# Patient Record
Sex: MALE | Race: WHITE | Employment: FULL TIME | URBAN - METROPOLITAN AREA
[De-identification: names, ages, dates, MRNs, and addresses within clinical notes are randomized per-mention and may not be internally consistent; named-entity substitution may affect disease eponyms.]

---

## 2021-03-09 ENCOUNTER — HOSPITAL ENCOUNTER (EMERGENCY)
Age: 46
Discharge: HOME | End: 2021-03-09
Payer: COMMERCIAL

## 2021-03-09 VITALS
SYSTOLIC BLOOD PRESSURE: 142 MMHG | WEIGHT: 200 LBS | BODY MASS INDEX: 27.09 KG/M2 | TEMPERATURE: 98 F | DIASTOLIC BLOOD PRESSURE: 105 MMHG | HEIGHT: 72 IN | HEART RATE: 90 BPM | RESPIRATION RATE: 18 BRPM | OXYGEN SATURATION: 98 %

## 2021-03-09 DIAGNOSIS — K08.89 PAIN, DENTAL: Primary | ICD-10-CM

## 2021-03-09 DIAGNOSIS — R03.0 ELEVATED BLOOD PRESSURE READING: ICD-10-CM

## 2021-03-09 PROCEDURE — 99284 EMERGENCY DEPT VISIT MOD MDM: CPT

## 2021-03-09 PROCEDURE — 6370000000 HC RX 637 (ALT 250 FOR IP): Performed by: PHYSICIAN ASSISTANT

## 2021-03-09 RX ORDER — PENICILLIN V POTASSIUM 500 MG/1
500 TABLET ORAL ONCE
Status: COMPLETED | OUTPATIENT
Start: 2021-03-09 | End: 2021-03-09

## 2021-03-09 RX ORDER — LIDOCAINE HYDROCHLORIDE 20 MG/ML
15 SOLUTION OROPHARYNGEAL ONCE
Status: COMPLETED | OUTPATIENT
Start: 2021-03-09 | End: 2021-03-09

## 2021-03-09 RX ORDER — PENICILLIN V POTASSIUM 500 MG/1
500 TABLET ORAL 4 TIMES DAILY
Qty: 40 TABLET | Refills: 0 | Status: SHIPPED | OUTPATIENT
Start: 2021-03-09 | End: 2021-03-19

## 2021-03-09 RX ADMIN — LIDOCAINE HYDROCHLORIDE 15 ML: 20 SOLUTION ORAL; TOPICAL at 01:25

## 2021-03-09 RX ADMIN — PENICILLIN V POTASSIUM 500 MG: 500 TABLET, FILM COATED ORAL at 01:25

## 2021-03-09 NOTE — ED PROVIDER NOTES
EMERGENCY DEPARTMENT ENCOUNTER      PCP: No primary care provider on file. CHIEF COMPLAINT    Chief Complaint   Patient presents with    Dental Pain       This patient was not evaluated by the attending physician. I have independently evaluated this patient. My supervising physician was available for consultation. HPI    Olvin Guerrero is a 39 y.o. male who presents with dental pain. Onset was yesterday morning. Duration of pain has been constant since the onset. Location is right lower back wisdom tooth. Context is patient reports that \"I woke up yesterday morning and realized my widsom tooth had popped through. \" Characteristic of pain is described as throbbing. Aggravating factors are eating and palpation. Patient reports ibuprofen was helping with pain but does not seem to be helping enough tonight. Patient denies fever, chills, difficulty swallowing, shortness of breath, tongue swelling, lip swelling, ear pain, sore throat, nasal congestion, facial redness or facial swelling. REVIEW OF SYSTEMS    General: Denies Fever, Denies Chills, Denies syncope  ENT:  See HPI  Respiratory: Denies difficulty breathing, wheezes. GI: Denies nausea, vomiting. Lymphatics:  No swollen nodes, red streaks  Skin: No rash. All other review of systems are negative  See HPI and nursing notes for additional information     [address]    History reviewed. No pertinent past medical history. History reviewed. No pertinent surgical history.     CURRENT MEDICATIONS    Current Outpatient Rx   Medication Sig Dispense Refill    penicillin v potassium (VEETID) 500 MG tablet Take 1 tablet by mouth 4 times daily for 10 days 40 tablet 0    Magic Mouthwash (MIRACLE MOUTHWASH) Swish and spit 5 mLs 4 times daily as needed for Irritation (Equal parts of Benadryl, Maalox, and Viscous lidocaine.) 240 mL 0       ALLERGIES    No Known Allergies    SOCIAL & FAMILY HISTORY    Social History Socioeconomic History    Marital status: Single     Spouse name: None    Number of children: None    Years of education: None    Highest education level: None   Occupational History    None   Social Needs    Financial resource strain: None    Food insecurity     Worry: None     Inability: None    Transportation needs     Medical: None     Non-medical: None   Tobacco Use    Smoking status: Current Every Day Smoker     Packs/day: 1.00     Types: Cigarettes   Substance and Sexual Activity    Alcohol use: Yes    Drug use: Yes     Types: Marijuana    Sexual activity: Never   Lifestyle    Physical activity     Days per week: None     Minutes per session: None    Stress: None   Relationships    Social connections     Talks on phone: None     Gets together: None     Attends Buddhism service: None     Active member of club or organization: None     Attends meetings of clubs or organizations: None     Relationship status: None    Intimate partner violence     Fear of current or ex partner: None     Emotionally abused: None     Physically abused: None     Forced sexual activity: None   Other Topics Concern    None   Social History Narrative    None     History reviewed. No pertinent family history. PHYSICAL EXAM    VITAL SIGNS: BP (!) 142/105   Pulse 90   Temp 98 °F (36.7 °C) (Oral)   Resp 18   Ht 6' (1.829 m)   Wt 200 lb (90.7 kg)   SpO2 98%   BMI 27.12 kg/m²    Constitutional:  Well developed, well nourished, no acute distress   HENT:  Atraumatic, moist mucus membranes. EOMI. No proptosis. Ear canals and TMs clear bilateral.  There is no maxillary sinus tenderness to percussion or redness/warmth.   Neck/Lymphatics: supple, no JVD, no swollen nodes   Musculoskeletal:  No edema, no deformities  Integument:  Skin warm and dry, no petechiae   Neurologic:  Alert & oriented, no slurred speech  Psych: Pleasant affect, no hallucinations    Dental:       Tooth #32 is partially erupted through the gingiva and tooth appears slightly darker than the other teeth and gingiva appears mildly erythematous and inflamed. Rest of teeth are nontender to palpation. There is no fluctuance surrounding tooth #32. Gingival buccal interface without obvious mass or discoloration, or fluctuance to palpation. No sublingual swelling or masses  No submandibular swelling. No facial swelling or redness    Oropharynx:    Posterior pharynx without swelling, tonsillar hypertrophy. Uvula is midline and rises evenly with phonation. No sublingual swelling. ED COURSE & MEDICAL DECISION MAKING       Vital signs and nursing notes reviewed during ED course. I have independently evaluated this patient. Supervising physician present in the Emergency Department, available for consultation, throughout entirety of patient care. Patient presents as above with dental pain which is likely secondary to tooth decay/dental caries vs early periapical abscess vs pulpitis. Clinically there is no evidence of oral abscess, no facial cellulitis, and no evidence of Amaury's angina. No signs of patient having difficulty with swallowing or handling oral secretions at this time. I discussed that if an abscess is developing it may require drainage in the future, but currently there are no drainable sites, and patient has no evidence of sepsis. I stressed the need for dental followup as emergency department treatment is not the definitive treatment of choice (dental clinic/office list provided). Patient treated with penicillin VK and viscous lidocaine in the ED today for pain and early dental infection. Today I provided patient with prescriptions for pain medication & Penicillin VK- we discussed medications. Patient is nontoxic appearing. Vital signs are stable-blood pressure is mildly elevated and therefore recommend recheck with [facility] in 2 days for recheck. Patient is stable for outpatient management.      All pertinent Lab data and radiographic results reviewed with patient at bedside. The patient and/or the family were informed of the results of any tests/labs/imaging, the treatment plan, and time was allotted to answer questions. Diagnosis, disposition, and plan reviewed with patient today. Patient is comfortable with discharge at this time. Again patient agrees to follow up with dentist as soon as possible for definitive care. PCP referral also provided today. Return to emergency department warning signs discussed in detail with patient who understands and agrees to returning for any new or worsening signs or symptoms, including but not limited to difficulty swallowing, increased jaw swelling, fever, increased pain. Clinical  IMPRESSION    1. Pain, dental    2.  Elevated blood pressure reading        Disposition referral (if applicable):  Dentist  See dental packet  Schedule an appointment as soon as possible for a visit   Recheck as soon as possible    [address] [facility] Emergency Department  [facility] [phone]  Go to   Return to ED if symptoms worsen or new symptoms    [address]  [phone]  Call today  Holland Tatum in 2 days for elevated blood pressure reading      Disposition medications (if applicable):  Discharge Medication List as of 3/9/2021  1:30 AM      START taking these medications    Details   penicillin v potassium (VEETID) 500 MG tablet Take 1 tablet by mouth 4 times daily for 10 days, Disp-40 tablet, R-0Normal      Magic Mouthwash (MIRACLE MOUTHWASH) Swish and spit 5 mLs 4 times daily as needed for Irritation (Equal parts of Benadryl, Maalox, and Viscous lidocaine.), Disp-240 mL, R-0Normal               Comment: Please note this report has been produced using speech recognition software and may contain errors related to that system including errors in grammar, punctuation, and spelling, as well as words and phrases that may be inappropriate. If there are any questions or concerns please feel free to contact the dictating provider for clarification.           Celina Ibarra PA-C  03/09/21 0226

## 2021-04-09 ENCOUNTER — HOSPITAL ENCOUNTER (EMERGENCY)
Age: 46
Discharge: HOME | End: 2021-04-09
Payer: COMMERCIAL

## 2021-04-09 ENCOUNTER — APPOINTMENT (OUTPATIENT)
Dept: ULTRASOUND IMAGING | Age: 46
End: 2021-04-09
Payer: COMMERCIAL

## 2021-04-09 VITALS
BODY MASS INDEX: 27.09 KG/M2 | WEIGHT: 200 LBS | HEART RATE: 76 BPM | DIASTOLIC BLOOD PRESSURE: 74 MMHG | SYSTOLIC BLOOD PRESSURE: 147 MMHG | TEMPERATURE: 98.2 F | HEIGHT: 72 IN | OXYGEN SATURATION: 100 % | RESPIRATION RATE: 17 BRPM

## 2021-04-09 DIAGNOSIS — N50.82 SCROTAL PAIN: Primary | ICD-10-CM

## 2021-04-09 DIAGNOSIS — A63.0 GENITAL WARTS: ICD-10-CM

## 2021-04-09 LAB
BACTERIA: NEGATIVE /HPF
BILIRUBIN URINE: NEGATIVE MG/DL
BLOOD, URINE: NEGATIVE
CLARITY: CLEAR
COLOR: COLORLESS
GLUCOSE, URINE: NEGATIVE MG/DL
KETONES, URINE: NEGATIVE MG/DL
LEUKOCYTE ESTERASE, URINE: NEGATIVE
NITRITE URINE, QUANTITATIVE: NEGATIVE
PH, URINE: 7 (ref 5–8)
PROTEIN UA: NEGATIVE MG/DL
RBC URINE: ABNORMAL /HPF (ref 0–3)
SPECIFIC GRAVITY UA: 1 (ref 1–1.03)
TRICHOMONAS: ABNORMAL /HPF
UROBILINOGEN, URINE: NEGATIVE MG/DL (ref 0.2–1)
WBC UA: <1 /HPF (ref 0–2)

## 2021-04-09 PROCEDURE — 87591 N.GONORRHOEAE DNA AMP PROB: CPT

## 2021-04-09 PROCEDURE — 99284 EMERGENCY DEPT VISIT MOD MDM: CPT

## 2021-04-09 PROCEDURE — 76870 US EXAM SCROTUM: CPT

## 2021-04-09 PROCEDURE — 93975 VASCULAR STUDY: CPT

## 2021-04-09 PROCEDURE — 81001 URINALYSIS AUTO W/SCOPE: CPT

## 2021-04-09 PROCEDURE — 87491 CHLMYD TRACH DNA AMP PROBE: CPT

## 2021-04-09 NOTE — ED NOTES
Patient discharged to home at this time. Discharge instructions and follow up care discussed, patient voices understanding.       RITA De  04/09/21 [id number]

## 2021-04-09 NOTE — ED PROVIDER NOTES
As physician-in-triage, I performed a medical screening history and physical exam on this patient. I performed a medical screening examination and evaluated this patient briefly precious tele-health platform with the purpose of initiating their ED workup in an expeditious manner. Please see notes from other ED providers regarding comprehensive evaluation including full history, physical exam, interpretation of results, and medical decision making/disposition. CHIEF COMPLAINT  No chief complaint on file. HISTORY OF PRESENT ILLNESS  Yamilex Barber is a 39 y.o. male presents to the emergency department for evaluation of left testicular pain. Patient reports that pain started yesterday and has been persistent. He describes a dull, achy pain localized to left testicle. Occasionally radiates to the groin. No history of similar symptoms in the past.  Patient sexually active. Last intercourse was on Monday. No pain with intercourse. Patient was able to maintain an erection. Denies any penile bleeding or discharge. Denies scrotal or penile erythema or swelling. Patient does have a previous history of trichomonas. Denies nausea, vomiting. Denies any genitourinary trauma. No dysuria or hematuria. Denies additional precipitating, modifying, alleviating features. Elida Bryant PHYSICAL EXAM  BP (!) 156/93   Pulse 84   Temp 98 °F (36.7 °C) (Oral)   Resp 16   Ht 6' (1.829 m)   Wt 200 lb (90.7 kg)   SpO2 99%   BMI 27.12 kg/m²     On exam, the patient appears in no acute distress. Speech is clear. Breathing is unlabored. Moves all extremities    Comment: Please note this report has been produced using speech recognition software and may contain errors related to that system including errors in grammar, punctuation, and spelling, as well as words and phrases that may be inappropriate. If there are any questions or concerns please feel free to contact the dictating provider for clarification.        Abundio REDMOND [place], DO  04/09/21 [id number]

## 2021-04-09 NOTE — ED PROVIDER NOTES
eMERGENCY dEPARTMENT eNCOUnter         [address] [facility]     PCP: No primary care provider on file. CHIEF COMPLAINT    Chief Complaint   Patient presents with    Groin Pain       HPI    Gemma Terrell is a 39 y.o. male who presents with left-sided groin and testicular pain. Onset is prior to arrival, yesterday. Context is patient denies any history of trauma or injury. Began developing pain to the inferior and posterior aspect of the left testicle without noted swelling or asymmetry. No history of similar. States that he has recently tested negative for STDs. Did have sexual and course 2 days ago but was asymptomatic for any pain during sex, no pain with ejaculation. Denying any penile pain, swelling or lesions. No urethral discharge. Denies any dysuria, hematuria history of kidney stones. Did state pain intermittently radiates into the left groin. No other fever chills, nausea vomiting or diarrhea or other abdominal pain. Patient does state for the last several months she has been developing \"warts\" to the  region that he has not had evaluated as \"it is embarrassing. \"  Has been treating it with home remedies including manually picking off the lesions however states that they continue to spread. Denying any lesions to the penile shaft. No joint pain or other body or extremity rash. No history of testicular torsion or surgery. At time my evaluation, patient states that he had taken ibuprofen and is not currently having any groin or testicular pain. REVIEW OF SYSTEMS    Constitutional:  Denies fever, chills, weight loss or weakness   HENT:  Denies sore throat or ear pain   Cardiovascular:  Denies chest pain, palpitations or swelling   Respiratory:  Denies cough or shortness of breath   GI:  See HPI above  : SEe HPI  Musculoskeletal:  No pain or swelling of extremities.   Skin:  Denies rash  Neurologic:  Denies headache, focal weakness or sensory changes   Endocrine:  Denies polyuria or polydypsia   Lymphatic:  Denies swollen glands     All other review of systems are negative  See HPI and nursing notes for additional information     PAST MEDICAL & SURGICAL HISTORY    No past medical history on file. No past surgical history on file. CURRENT MEDICATIONS    Current Outpatient Rx   Medication Sig Dispense Refill    Magic Mouthwash (MIRACLE MOUTHWASH) Swish and spit 5 mLs 4 times daily as needed for Irritation (Equal parts of Benadryl, Maalox, and Viscous lidocaine.) 240 mL 0       ALLERGIES    No Known Allergies    SOCIAL AND FAMILY HISTORY    Social History     Socioeconomic History    Marital status: Single     Spouse name: Not on file    Number of children: Not on file    Years of education: Not on file    Highest education level: Not on file   Occupational History    Not on file   Social Needs    Financial resource strain: Not on file    Food insecurity     Worry: Not on file     Inability: Not on file    Transportation needs     Medical: Not on file     Non-medical: Not on file   Tobacco Use    Smoking status: Current Every Day Smoker     Packs/day: 1.00     Types: Cigarettes   Substance and Sexual Activity    Alcohol use:  Yes    Drug use: Yes     Types: Marijuana    Sexual activity: Never   Lifestyle    Physical activity     Days per week: Not on file     Minutes per session: Not on file    Stress: Not on file   Relationships    Social connections     Talks on phone: Not on file     Gets together: Not on file     Attends Yarsanism service: Not on file     Active member of club or organization: Not on file     Attends meetings of clubs or organizations: Not on file     Relationship status: Not on file    Intimate partner violence     Fear of current or ex partner: Not on file     Emotionally abused: Not on file     Physically abused: Not on file     Forced sexual activity: Not on file   Other Topics Concern    Not on file Social History Narrative    Not on file     No family history on file. PHYSICAL EXAM    VITAL SIGNS: BP (!) 156/93   Pulse 84   Temp 98 °F (36.7 °C) (Oral)   Resp 16   Ht 6' (1.829 m)   Wt 200 lb (90.7 kg)   SpO2 99%   BMI 27.12 kg/m²   General:  Well developed, well nourihsed, In no acute distress  Eyes:  Sclera nonicteric, Conjunctiva moist, No discharge  Head:  Normocephalic, Atramautic  Neck/Lymphatics: Supple, no JVD, no swollen nodes  Respiratory:  Clear to ausculation bilaterally, No retractions, Non labored breathing  Cardiovascular:  RRR, No murmurs/gallops/thrills  GI:  No gross discoloration. Bowel sounds present in all quadrants, No audible bruits. Soft,  Nondistended. No localized abdominal tenderness and without rebound tenderness or guarding, No palpable pulsatile masses or obvious hernias for evidence of incarcerated strangulated hernias. .   Genitourinary: (chaperoned by male health care provider Argenis Holder [employer] [facility])  Circumcised. Penile lesions are absent. Noted to the suprapubic region and right inner thigh region are 2 discrete areas of hyperpigmented cauliflower/verrucous lesions without discharge, tenderness. Not on erythematous base. No duration or fluctuance, no active drainage or discharge. No lesions noted to the scrotal sac or penile shaft. There is no urethral discharge or bleeding. There is no penile erythema, edema, or deformity. There is no scrotal erythema, edema, masses, or tenderness. There is tenderness palpation along the inferior and posterior aspects of the left scrotal region/testicle without palpable mass. No swelling. Equal and normal cremasteric reflex bilaterally. Left scrotal region does not appear high riding. Inguinal hernias are absent. Perineal crepitus, ecchymoses, erythema are absent. Back:  No CVA tenderness to percussion.   Musculoskeletal:  No edema, No deformity  Peripheral Vascular: Distal pulses 2+ equal bilaterally  Integument: No rash, Normal turgor  Neurologic:  Alert & oriented, Normal speech  Psychiatric: Cooperative, pleasant affect       I have reviewed and interpreted all of the currently available lab results from this visit (if applicable):  Results for orders placed or performed during the hospital encounter of 04/09/21   Urinalysis   Result Value Ref Range    Color, UA COLORLESS (A) YELLOW    Clarity, UA CLEAR CLEAR    Glucose, Urine NEGATIVE NEGATIVE MG/DL    Bilirubin Urine NEGATIVE NEGATIVE MG/DL    Ketones, Urine NEGATIVE NEGATIVE MG/DL    Specific Gravity, UA 1.002 1.001 - 1.035    Blood, Urine NEGATIVE NEGATIVE    pH, Urine 7.0 5.0 - 8.0    Protein, UA NEGATIVE NEGATIVE MG/DL    Urobilinogen, Urine NEGATIVE 0.2 - 1.0 MG/DL    Nitrite Urine, Quantitative NEGATIVE NEGATIVE    Leukocyte Esterase, Urine NEGATIVE NEGATIVE    RBC, UA NONE SEEN 0 - 3 /HPF    WBC, UA <1 0 - 2 /HPF    Bacteria, UA NEGATIVE NEGATIVE /HPF    Trichomonas, UA NONE SEEN NONE SEEN /HPF        RADIOLOGY/PROCEDURES        US DUP ABD PEL RETRO SCROT COMPLETE (Preliminary result)  Result time 04/09/21 10:56:14  Preliminary result by Tobias Brandon MD (04/09/21 10:56:14)                Impression:    Unremarkable testicular ultrasound with normal Doppler flow. Narrative:    EXAMINATION:   ULTRASOUND OF THE SCROTUM/TESTICLES WITH COLOR DOPPLER FLOW EVALUATION;   DOPPLER EVALUATION OF THE PELVIS     4/9/2021     COMPARISON:   None.      HISTORY:   ORDERING SYSTEM PROVIDED HISTORY: left testicular pain   TECHNOLOGIST PROVIDED HISTORY:   Reason for exam:->left testicular pain   Reason for Exam: left testicular pain, occasionally radiates to the groin   Acuity: Acute   Type of Exam: Initial; ORDERING SYSTEM PROVIDED HISTORY: left testicular pain   TECHNOLOGIST PROVIDED HISTORY:   Reason for exam:->left testicular pain   Reason for Exam: left testicular pain, occasionally radiates to groin   Acuity: Acute   Type of Exam: Initial     FINDINGS:     Measurements: Right testicle: 4.9 x 2.4 x 3.2 cm     Left testicle: 4.6 x 2.3 x 3.1 cm       Right:     Grey scale:  The right testicle demonstrates normal homogeneous echotexture   without focal lesion.  No evidence of testicular microlithiasis. Doppler Evaluation:  There is normal arterial and venous Doppler flow within   the testicle. Scrotal Sac:  No evidence of hydrocele or varicocele. Epididymis:  No acute abnormality. Left:     Grey scale:  The left testicle demonstrates normal homogeneous echotexture   without focal lesion.  No evidence of testicular microlithiasis. Doppler Evaluation:  There is normal arterial and venous Doppler flow within   the testicle. Scrotal Sac:  No evidence of hydrocele or varicocele. Epididymis:  No acute abnormality.                 Preliminary result by Pee Cooper MD (04/09/21 10:52:28)                Impression:    Unremarkable testicular ultrasound with normal Doppler flow.                     US SCROTUM AND TESTICLES (Preliminary result)  Result time 04/09/21 10:56:14  Preliminary result by Pee Cooper MD (04/09/21 10:56:14)                Impression:    Unremarkable testicular ultrasound with normal Doppler flow. Narrative:    EXAMINATION:   ULTRASOUND OF THE SCROTUM/TESTICLES WITH COLOR DOPPLER FLOW EVALUATION;   DOPPLER EVALUATION OF THE PELVIS     4/9/2021     COMPARISON:   None.      HISTORY:   ORDERING SYSTEM PROVIDED HISTORY: left testicular pain   TECHNOLOGIST PROVIDED HISTORY:   Reason for exam:->left testicular pain   Reason for Exam: left testicular pain, occasionally radiates to the groin   Acuity: Acute   Type of Exam: Initial; ORDERING SYSTEM PROVIDED HISTORY: left testicular pain   TECHNOLOGIST PROVIDED HISTORY:   Reason for exam:->left testicular pain   Reason for Exam: left testicular pain, occasionally radiates to groin   Acuity: Acute   Type of Exam: Initial     FINDINGS:     Measurements:     Right testicle: 4.9 x 2.4 x 3.2 cm     Left testicle: 4.6 x 2.3 x 3.1 cm       Right:     Grey scale:  The right testicle demonstrates normal homogeneous echotexture   without focal lesion.  No evidence of testicular microlithiasis. Doppler Evaluation:  There is normal arterial and venous Doppler flow within   the testicle. Scrotal Sac:  No evidence of hydrocele or varicocele. Epididymis:  No acute abnormality. Left:     Grey scale:  The left testicle demonstrates normal homogeneous echotexture   without focal lesion.  No evidence of testicular microlithiasis. Doppler Evaluation:  There is normal arterial and venous Doppler flow within   the testicle. Scrotal Sac:  No evidence of hydrocele or varicocele. Epididymis:  No acute abnormality.                 Preliminary result by Abby Alvarado MD (04/09/21 10:52:28)                Impression:    Unremarkable testicular ultrasound with normal Doppler flow. ED COURSE & MEDICAL DECISION MAKING      Vital signs and nursing notes reviewed during ED course. I have independently evaluated this patient . Supervising MD - Dr Amanda Breaux - present in the Emergency Department, available for consultation, throughout entirety of  patient care. All pertinent Lab data and radiographic results reviewed with patient at bedside. The patient and / or the family were informed of the results of any tests, a time was given to answer questions, a plan was proposed and they agreed with plan. Differential diagnosis: Abdominal Aortic Aneurysm, Ischemic Bowel, Bowel Obstruction, Acute Cholecystitis, Acute Appendicitis, other    Clinical  IMPRESSION    1. Scrotal pain    2. Genital warts        Patient presents with left-sided scrotal/testicular pain. On exam, well-appearing nontoxic 80-year-old male, afebrile no acute distress. Work was initiated triage.   Abdominal exam is nonsurgical.  On chaperoned  exam, patient does have 2 separate lesions noted suprapubic and right inner thigh region which appears consistent with wart like structure, no evidence of secondary infection, cellulitis or abscess. No other penile lesions. There is tenderness palpation of the left scrotal area without overlying skin changes redness or warmth or palpable mass. Normal cremasteric reflex bilaterally. UA is unremarkable. Pending GC/chlamydia. Patient states that he has recently tested negative for STDs and is denying any new concern for this, would like to await GC and chlamydia results prior to treatment. Ultrasound scrotum and testicles shows no evidence of testicular mass or lesion is confirmed bilateral normal Doppler flow, no evidence of torsion. At this time, discussed with patient unclear exact etiology for left-sided scrotal pain. No evidence of an acute surgical process or infectious process at this time. Will be given referral to urology as well as a family doctor for outpatient follow-up and management of noted genital warts. Urine is clear of any gross blood or bacteria concerning for an obstructive uropathy causing radiating pain to the low scrotal region and patient had no flank pain on my exam.  Patient is discharged at this time in stable condition. Encouraged utilization of tight fitting undergarments, elevation, ice as well as continued NSAIDs. Given outpatient follow-up with urology as discussed above but given a low threshold to return back to the ED with any new worsening symptoms including swelling or skin changes to the left scrotal area or worsening pain. Other return warnings discussed. In light of current events, I did utilize appropriate PPE (including N95 face mask, safety glasses, gloves as recommended by the health facility/national standard best practice, during my bedside interactions with the patient. Patient was masked throughout ED course.   Full droplet precaution as well as full PPE was followed throughout patient's ED course

## 2021-04-11 LAB
CHLAMYDIA TRACHOMATIS AMPLIFIED DET: NEGATIVE
N GONORRHOEAE AMPLIFIED DET: NEGATIVE